# Patient Record
Sex: MALE | Race: WHITE
[De-identification: names, ages, dates, MRNs, and addresses within clinical notes are randomized per-mention and may not be internally consistent; named-entity substitution may affect disease eponyms.]

---

## 2023-03-02 ENCOUNTER — HOSPITAL ENCOUNTER (OUTPATIENT)
Dept: HOSPITAL 95 - ORSCMMR | Age: 48
Discharge: HOME | End: 2023-03-02
Attending: STUDENT IN AN ORGANIZED HEALTH CARE EDUCATION/TRAINING PROGRAM
Payer: COMMERCIAL

## 2023-03-02 VITALS — HEIGHT: 66 IN | WEIGHT: 190.92 LBS | BODY MASS INDEX: 30.68 KG/M2

## 2023-03-02 DIAGNOSIS — K42.0: Primary | ICD-10-CM

## 2023-03-02 DIAGNOSIS — F17.220: ICD-10-CM

## 2023-03-02 PROCEDURE — C1781 MESH (IMPLANTABLE): HCPCS

## 2023-03-02 PROCEDURE — A9270 NON-COVERED ITEM OR SERVICE: HCPCS

## 2023-03-02 PROCEDURE — 0WQF4ZZ REPAIR ABDOMINAL WALL, PERCUTANEOUS ENDOSCOPIC APPROACH: ICD-10-PCS | Performed by: STUDENT IN AN ORGANIZED HEALTH CARE EDUCATION/TRAINING PROGRAM

## 2023-03-02 NOTE — NUR
History, Chart, Medications and Allergies reviewed before start of
procedure.LUNGS CLEAR,PRE OP TEACHING DONE

## 2023-03-02 NOTE — NUR
Discharge instructions reviewed with patient. Patient verbalizes understanding.
Copy given to patient to take home.
Patient States Post-Procedure ride home has been arranged.
Dressing to procedure site clean, dry, intact with no visible
drainage, swelling, erythema or bruising noted.
Discharged via wheelchair to private car for ride home.

## 2025-03-12 ENCOUNTER — HOSPITAL ENCOUNTER (OUTPATIENT)
Dept: HOSPITAL 95 - ORSCSDS | Age: 50
Discharge: HOME | End: 2025-03-12
Attending: OTOLARYNGOLOGY
Payer: COMMERCIAL

## 2025-03-12 VITALS — HEIGHT: 66 IN | BODY MASS INDEX: 29.87 KG/M2 | WEIGHT: 185.85 LBS

## 2025-03-12 VITALS — SYSTOLIC BLOOD PRESSURE: 133 MMHG | DIASTOLIC BLOOD PRESSURE: 99 MMHG

## 2025-03-12 DIAGNOSIS — J34.2: ICD-10-CM

## 2025-03-12 DIAGNOSIS — E66.9: ICD-10-CM

## 2025-03-12 DIAGNOSIS — J32.8: Primary | ICD-10-CM

## 2025-03-12 DIAGNOSIS — F17.220: ICD-10-CM

## 2025-03-12 DIAGNOSIS — Z79.899: ICD-10-CM

## 2025-03-12 PROCEDURE — A9270 NON-COVERED ITEM OR SERVICE: HCPCS

## 2025-03-12 PROCEDURE — 09SM0ZZ REPOSITION NASAL SEPTUM, OPEN APPROACH: ICD-10-PCS | Performed by: OTOLARYNGOLOGY

## 2025-03-12 PROCEDURE — 09DU4ZZ EXTRACTION OF RIGHT ETHMOID SINUS, PERCUTANEOUS ENDOSCOPIC APPROACH: ICD-10-PCS | Performed by: OTOLARYNGOLOGY

## 2025-03-12 PROCEDURE — C2625 STENT, NON-COR, TEM W/DEL SY: HCPCS

## 2025-03-12 PROCEDURE — 09DV4ZZ EXTRACTION OF LEFT ETHMOID SINUS, PERCUTANEOUS ENDOSCOPIC APPROACH: ICD-10-PCS | Performed by: OTOLARYNGOLOGY

## 2025-03-12 NOTE — NUR
03/12/[address] [place], [place] [address] NOTIFIED THAT PATIENT HAD CHEW IN HIS MOUTH UNTIL THIS
MORNING ABOUT [phone]. NOW NEW ORDERS.

## 2025-03-31 ENCOUNTER — HOSPITAL ENCOUNTER (OUTPATIENT)
Dept: HOSPITAL 95 - ORSCSDS | Age: 50
Discharge: HOME | End: 2025-03-31
Attending: GENERAL PRACTICE
Payer: COMMERCIAL

## 2025-03-31 VITALS — DIASTOLIC BLOOD PRESSURE: 99 MMHG | SYSTOLIC BLOOD PRESSURE: 135 MMHG

## 2025-03-31 VITALS — HEIGHT: 66 IN | BODY MASS INDEX: 30.4 KG/M2 | WEIGHT: 189.16 LBS

## 2025-03-31 DIAGNOSIS — Z53.9: ICD-10-CM

## 2025-03-31 DIAGNOSIS — R22.32: Primary | ICD-10-CM

## 2025-03-31 DIAGNOSIS — D49.2: ICD-10-CM

## 2025-04-07 ENCOUNTER — HOSPITAL ENCOUNTER (OUTPATIENT)
Dept: HOSPITAL 95 - ORSCSDS | Age: 50
Discharge: HOME | End: 2025-04-07
Attending: GENERAL PRACTICE
Payer: COMMERCIAL

## 2025-04-07 VITALS — WEIGHT: 191.8 LBS | HEIGHT: 66 IN | BODY MASS INDEX: 30.82 KG/M2

## 2025-04-07 VITALS — SYSTOLIC BLOOD PRESSURE: 110 MMHG | DIASTOLIC BLOOD PRESSURE: 84 MMHG

## 2025-04-07 DIAGNOSIS — Z72.0: ICD-10-CM

## 2025-04-07 DIAGNOSIS — E66.9: ICD-10-CM

## 2025-04-07 DIAGNOSIS — D18.01: Primary | ICD-10-CM

## 2025-04-07 DIAGNOSIS — Z79.899: ICD-10-CM

## 2025-04-07 PROCEDURE — 0XBF0ZX EXCISION OF LEFT LOWER ARM, OPEN APPROACH, DIAGNOSTIC: ICD-10-PCS | Performed by: GENERAL PRACTICE
